# Patient Record
(demographics unavailable — no encounter records)

---
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
## 2018-10-22 NOTE — DIAGNOSTIC IMAGING REPORT
Indication: Headache. Facial head trauma

 

Technique: Contiguous 5 mm thick transaxial imaging of the head obtained in a Siemens

Sensation 64 slice CT scanner.  Soft tissue and bone windows generated.  Automatic

Exposure Control was utilized.

 

 

Total Dose length Product (DLP):  1990.96 mGycm

 

CT Dose Index Volume (CTDIvol):   70.38,28.19 mGy

 

Comparison: none

 

Findings: The size and configuration of the cortical sulci, basal cisterns, and

ventricles are within normal limits for age. There is no mass effect, midline shift,

or edema identified. There is no evidence of acute hemorrhage or abnormal intra-axial

or extra-axial fluid collections. There is a nasal fracture. Please refer to the

maxillofacial CT report. Soft tissue swelling also noted over the nose.

 

Impression: No mass effect, edema or acute bleed.

 

Nasal fracture

 

[facility] has communicated the preliminary results to the Emergency

Department.  Their findings are largely concordant with this report.

 

 

 

The CT scanner at [facility] is accredited by the [employer]

[employer] and the scans are performed using dose optimization techniques as

appropriate to a performed exam including Automatic Exposure control.

## 2018-10-22 NOTE — DIAGNOSTIC IMAGING REPORT
Indication: Trauma and facial pain. The facial/nasal fractures

 

Technique: Continuous helical transaxial imaging of the maxillofacial structures

obtained without intravenous contrast administration. Coronal 2-D reformats were also

obtained. Study obtained in a Siemens sensation 64 slice CT.  Automatic Exposure

Control was utilized.

 

 

Total Dose length Product (DLP):  1990 mGycm

 

CT Dose Index Volume (CTDIvol):   0.15, 70.38, 28.19 mGy

 

Comparison: None

 

Findings: There is acute comminuted fracture of the nasal bone. There is also

fracture of the left frontal process of the maxilla. There is a fracture of the nasal

septum is well anteriorly. The nasal bone is shifted toward the right. There is edema

within the nasal cavity. There is a prominent right jennifer bullosa. There is no

obvious nasal hematoma. Mild mucosal thickening within the ethmoid sinus and within

the maxillary sinus demonstrated. Soft tissue swelling and some soft tissue air noted

over the nasal bone indicative of a contusion laceration injury. Some facial soft

tissue swelling is present as well anteriorly. No additional fractures are

identified. The orbits appear normal bilaterally. Pterygoid plates zygoma visualized

mandible and other osseous structures are unremarkable. Mastoids are clear

bilaterally.

 

IMPRESSION:

 

Acute comminuted nasal fracture as described above.

 

Sinusitis.

 

[facility] has communicated the preliminary results to the Emergency

Department.  Their findings are largely concordant with this report.

 

 

 

The CT scanner at [facility] is accredited by the [employer]

Rad[employer] and the scans are performed using dose optimization techniques as

appropriate to a performed exam including Automatic Exposure control.